# Patient Record
Sex: FEMALE | Race: BLACK OR AFRICAN AMERICAN | NOT HISPANIC OR LATINO | URBAN - METROPOLITAN AREA
[De-identification: names, ages, dates, MRNs, and addresses within clinical notes are randomized per-mention and may not be internally consistent; named-entity substitution may affect disease eponyms.]

---

## 2023-11-20 ENCOUNTER — EMERGENCY (EMERGENCY)
Facility: HOSPITAL | Age: 28
LOS: 1 days | Discharge: HOME | End: 2023-11-20
Admitting: EMERGENCY MEDICINE
Payer: MEDICAID

## 2023-11-20 VITALS
SYSTOLIC BLOOD PRESSURE: 120 MMHG | RESPIRATION RATE: 15 BRPM | DIASTOLIC BLOOD PRESSURE: 77 MMHG | OXYGEN SATURATION: 100 % | TEMPERATURE: 98 F | HEART RATE: 75 BPM

## 2023-11-20 PROCEDURE — 99283 EMERGENCY DEPT VISIT LOW MDM: CPT

## 2023-11-20 NOTE — ED PROVIDER NOTE - OBJECTIVE STATEMENT
This is a 28 yr old F, pmh eating disorder, adhd with c/o sudden onset of panic attack today. Reports she had several panic attacks but today she felt it last longer, and felt fidgety. Endorses currently on Vyvanse on the last 8 month and prn Adderall. She usually takes her Vyvanse 730-8 am and takes her dog for a walk. Today she just had a bad painc attack and headache nausea, which resolved prior to arrival. Explicitly denies si, hi, ah, vh, paranoia, psychosis. Denies physical complaint

## 2023-11-20 NOTE — ED PROVIDER NOTE - IV ALTEPLASE DOOR HIDDEN
In to assess pt. A&Ox4. VSS. Rates generalized pain 8/10, mostly in right neck and shoulder. Treated with prn tylenol, see MAR. Denies chest pain and SOB, Dr. Monie Wei at bedside, signing off today. IV site WDL. Voiced no other needs, assisted with set up of breakfast. Will continue to monitor.   Electronically signed by Yarely Olvera RN on 9/21/2019 at 11:14 AM show

## 2023-11-20 NOTE — ED ADULT TRIAGE NOTE - CHIEF COMPLAINT QUOTE
alert oriented c/o panic attack headache  with N/V hx  bulimia alert oriented c/o panic attack headache  with N/V ( no nausea or headache at present) hx  bulimia

## 2023-11-20 NOTE — ED PROVIDER NOTE - PATIENT PORTAL LINK FT
You can access the FollowMyHealth Patient Portal offered by [facility] by registering at the following website: [email or web address]. By joining [facility] [employer]’s FollowMyHealth portal, you will also be able to view your health information using other applications (apps) compatible with our system.

## 2023-11-20 NOTE — ED PROVIDER NOTE - NSFOLLOWUPINSTRUCTIONS_ED_ALL_ED_FT
Anxiety    Generalized anxiety disorder (ALEXIA) is a mental disorder. It is defined as anxiety that is not necessarily related to specific events or activities or is out of proportion to said events. Symptoms include restlessness, fatigue, difficulty concentrations, irritability and difficulty concentrating. It may interfere with life functions, including relationships, work, and school. If you were started on a medication, make sure to take exactly as prescribed and follow up with a psychiatrist.    SEEK IMMEDIATE MEDICAL CARE IF YOU HAVE ANY OF THE FOLLOWING SYMPTOMS: thoughts about hurting killing yourself, thoughts about hurting or killing somebody else, hallucinations, or worsening depression.